# Patient Record
Sex: FEMALE | Race: WHITE | Employment: FULL TIME | URBAN - METROPOLITAN AREA
[De-identification: names, ages, dates, MRNs, and addresses within clinical notes are randomized per-mention and may not be internally consistent; named-entity substitution may affect disease eponyms.]

---

## 2017-01-09 DIAGNOSIS — F41.8 ANXIETY ASSOCIATED WITH DEPRESSION: ICD-10-CM

## 2017-01-11 DIAGNOSIS — F41.8 ANXIETY ASSOCIATED WITH DEPRESSION: ICD-10-CM

## 2017-01-11 RX ORDER — LORAZEPAM 1 MG/1
3 TABLET ORAL
Qty: 90 TAB | Refills: 0 | OUTPATIENT
Start: 2017-01-11 | End: 2017-02-09 | Stop reason: SDUPTHER

## 2017-01-11 RX ORDER — LORAZEPAM 1 MG/1
3 TABLET ORAL
Qty: 90 TAB | Refills: 2 | OUTPATIENT
Start: 2017-01-11

## 2017-01-11 NOTE — TELEPHONE ENCOUNTER
From: Rosaura Ward  To: Efrem Shine NP  Sent: 1/9/2017 6:17 PM EST  Subject: Medication Renewal Request    Original authorizing provider: AKASH Koenig would like a refill of the following medications:  LORazepam (ATIVAN) 1 mg tablet Efrem Shine NP]    Preferred pharmacy: [place] [id number]:

## 2017-01-13 ENCOUNTER — OFFICE VISIT (OUTPATIENT)
Dept: FAMILY MEDICINE CLINIC | Age: 52
End: 2017-01-13

## 2017-01-13 VITALS
HEART RATE: 62 BPM | BODY MASS INDEX: 36.07 KG/M2 | WEIGHT: 196 LBS | RESPIRATION RATE: 18 BRPM | TEMPERATURE: 97.7 F | DIASTOLIC BLOOD PRESSURE: 80 MMHG | OXYGEN SATURATION: 99 % | HEIGHT: 62 IN | SYSTOLIC BLOOD PRESSURE: 105 MMHG

## 2017-01-13 DIAGNOSIS — E55.9 VITAMIN D DEFICIENCY: ICD-10-CM

## 2017-01-13 DIAGNOSIS — Z00.00 ANNUAL PHYSICAL EXAM: Primary | ICD-10-CM

## 2017-01-13 DIAGNOSIS — J30.9 CHRONIC ALLERGIC RHINITIS: ICD-10-CM

## 2017-01-13 DIAGNOSIS — Z12.11 SCREENING FOR MALIGNANT NEOPLASM OF COLON: ICD-10-CM

## 2017-01-13 DIAGNOSIS — E03.9 HYPOTHYROIDISM, UNSPECIFIED TYPE: ICD-10-CM

## 2017-01-13 RX ORDER — HYDROXYZINE 25 MG/1
TABLET, FILM COATED ORAL
Refills: 0 | COMMUNITY
Start: 2016-10-12 | End: 2017-01-13 | Stop reason: SDUPTHER

## 2017-01-13 RX ORDER — HYDROXYZINE 25 MG/1
TABLET, FILM COATED ORAL
Qty: 30 TAB | Refills: 2 | Status: SHIPPED | OUTPATIENT
Start: 2017-01-13

## 2017-01-13 NOTE — PROGRESS NOTES
Chief Complaint   Patient presents with   Evan Jeffers     Patient in office today for physical and fasting labs; have c.o of sinus and ear problem. 1. Have you been to the ER, urgent care clinic since your last visit? Hospitalized since your last visit? No    2. Have you seen or consulted any other health care providers outside of the [address] [place] [address] since your last visit? Include any pap smears or colon screening.  No

## 2017-01-13 NOTE — PROGRESS NOTES
Chief Complaint   Patient presents with    Physical    Labs     Patient in office today for physical and fasting labs; Pt also c/o of sinus and ear problem. 1. Have you been to the ER, urgent care clinic since your last visit? Hospitalized since your last visit? No    2. Have you seen or consulted any other health care providers outside of the [facility] since your last visit? Include any pap smears or colon screening. No    Health Maintenance Due   Topic Date Due    INFLUENZA AGE 9 TO ADULT  08/01/2016     Pt was seen at [facility] in Oct for ear infection sx. Was prescribed hydroxyzine which has really helped clear up her sinuses and congestion. Also helping her sinus headaches when they flare. Started having a sinus headache that started last night. Denies any fever. Denies any sick contacts. Denies any real drainage, sinuses feel pressure and swollen. Pt went to see ENT. Was told she had TMJ but dentist confirmed she did not have TMJ. Received her flu shot in October. Denies any other concerns at this time. Chief Complaint   Patient presents with   Evan Jeffers     she is a 54y.o. year old female who presents for evalution. Reviewed PmHx, RxHx, FmHx, SocHx, AllgHx and updated and dated in the chart.     Review of Systems - negative except as listed above in the HPI    Objective:     Vitals:    01/13/17 0803   BP: 105/80   Pulse: 62   Resp: 18   Temp: 97.7 °F (36.5 °C)   TempSrc: Oral   SpO2: 99%   Weight: 196 lb (88.9 kg)   Height: 5' 2\" (1.575 m)     Physical Examination: General appearance - alert, well appearing, and in no distress  Mental status - normal mood, behavior  Eyes - pupils equal and reactive, extraocular eye movements intact  Ears - bilateral TM's and external ear canals normal  Nose - normal and patent, no erythema, discharge or polyps and normal nontender sinuses  Mouth - mucous membranes moist, pharynx normal without lesions  Neck - supple, no significant adenopathy, carotids upstroke normal bilaterally, no bruits, thyroid exam: thyroid is normal in size without nodules or tenderness  Chest - clear to auscultation, no wheezes, rales or rhonchi, symmetric air entry  Heart - normal rate, regular rhythm, normal S1, S2, no murmurs  Extremities - peripheral pulses normal, no ankle edema, no clubbing or cyanosis    Assessment/ Plan:   Yarelis was seen today for physical and labs. Diagnoses and all orders for this visit:    Annual physical exam  -     LIPID PANEL  -     METABOLIC PANEL, COMPREHENSIVE  -     CBC WITH AUTOMATED DIFF  Healthy 54year old female. Will notify results of labs and deviate plan based on findings. Enc to follow a healthy diet and exercise as tolerated. Hypothyroidism, unspecified type  -     TSH 3RD GENERATION  Will deviate plan based on findings. Chronic allergic rhinitis  -     hydrOXYzine HCl (ATARAX) 25 mg tablet; TAKE ONE TABLET BY MOUTH daily as needed for allergies. Refilled rx. Continue taking daily as needed for allergic rhinitis flares. Continue other meds as prescribed. Vitamin D deficiency  -     VITAMIN D, 25 HYDROXY  Will deviate plan based on findings. Screening for malignant neoplasm of colon  -     REFERRAL TO COLON AND RECTAL SURGERY  Referral to colon and rectal for initial screening colonoscopy. Follow-up Disposition:  Return if symptoms worsen or fail to improve. I have discussed the diagnosis with the patient and the intended plan as seen in the above orders. The patient has received an after-visit summary and questions were answered concerning future plans.      Medication Side Effects and Warnings were discussed with patient: yes  Patient Labs were reviewed and or requested: yes  Patient Past Records were reviewed and or requested  yes  Patient / Caregiver Understanding of treatment plan was verbalized during office visit YES    ELIAN Black    There are no Patient Instructions on file for this visit.

## 2017-01-13 NOTE — MR AVS SNAPSHOT
Visit Information Date & Time Provider Department Dept. Phone Encounter #  
 1/13/2017  8:00 AM Chidi Rendon NP [address] [facility] [phone] [id number] Follow-up Instructions Return if symptoms worsen or fail to improve. Upcoming Health Maintenance Date Due INFLUENZA AGE 9 TO ADULT 8/1/2016 PAP AKA CERVICAL CYTOLOGY 3/10/2018 DTaP/Tdap/Td series (2 - Td) 2/25/2026 Allergies as of 1/13/2017  Review Complete On: 1/13/2017 By: Chidi Rendon NP Severity Noted Reaction Type Reactions Biaxin [Clarithromycin]  06/23/2015    Diarrhea Other Medication  06/23/2015    Not Reported This Time  
 steroids Current Immunizations  Reviewed on 2/25/2016 Name Date Influenza Vaccine 10/1/2016, 10/1/2015 Tdap 2/25/2016 Not reviewed this visit You Were Diagnosed With   
  
 Codes Comments Annual physical exam    -  Primary ICD-10-CM: Z00.00 ICD-9-CM: V70.0 Hypothyroidism, unspecified type     ICD-10-CM: E03.9 ICD-9-CM: 382. 9 Chronic allergic rhinitis     ICD-10-CM: J30.9 ICD-9-CM: 477.9 Vitamin D deficiency     ICD-10-CM: E55.9 ICD-9-CM: 268.9 Screening for malignant neoplasm of colon     ICD-10-CM: Z12.11 ICD-9-CM: V76.51 Vitals BP Pulse Temp Resp Height(growth percentile) Weight(growth percentile) 105/80 (BP 1 Location: Right arm, BP Patient Position: Sitting) 62 97.7 °F (36.5 °C) (Oral) 18 5' 2\" (1.575 m) 196 lb (88.9 kg) LMP SpO2 BMI OB Status Smoking Status 01/02/2017 99% 35.85 kg/m2 Premenopausal Never Smoker Vitals History BMI and BSA Data Body Mass Index Body Surface Area  
 35.85 kg/m 2 1.97 m 2 Preferred Pharmacy Pharmacy Name Phone [facility] [address] [facility], [address] [place] [address] Your Updated Medication List  
  
   
This list is accurate as of: 1/13/17  8:35 AM.  Always use your most recent med list.  
  
  
  
  
 Biotin 2,500 mcg Cap Take 5,000 mcg by mouth. buPROPion  mg SR tablet Commonly known as:  Nael Ponds Take 1 Tab by mouth two (2) times a day. CHILDREN'S MULTI VITAMINS PO Take  by mouth. hydrOXYzine HCl 25 mg tablet Commonly known as:  ATARAX TAKE ONE TABLET BY MOUTH daily as needed for allergies. levothyroxine 137 mcg tablet Commonly known as:  SYNTHROID Take 137 mcg by mouth Daily (before breakfast). Dispense brand Synthroid.  
  
 loratadine 10 mg tablet Commonly known as:  Hitesh Fleeting Take 10 mg by mouth daily. LORazepam 1 mg tablet Commonly known as:  ATIVAN Take 3 Tabs by mouth nightly as needed for Anxiety. Max Daily Amount: 3 mg. Prescriptions Sent to Pharmacy Refills  
 hydrOXYzine HCl (ATARAX) 25 mg tablet 2 Sig: TAKE ONE TABLET BY MOUTH daily as needed for allergies. Class: Normal  
 Pharmacy: [facility]. [address], [address] [place] [address] Ph #: [phone] We Performed the Following CBC WITH AUTOMATED DIFF [17991 CPT(R)] LIPID PANEL [43023 CPT(R)] METABOLIC PANEL, COMPREHENSIVE [08137 CPT(R)] REFERRAL TO COLON AND RECTAL SURGERY [REF17 Custom] TSH 3RD GENERATION [76631 CPT(R)] VITAMIN D, 25 HYDROXY [id number] CPT(R)] Follow-up Instructions Return if symptoms worsen or fail to improve. Referral Information Referral ID Referred By Referred To  
  
 [id number] Raymundo Diamond MD   
   [address] [facility] [address] [facility], [address] Phone: [phone] Fax: [phone] Visits Status Start Date End Date 1 New Request 1/13/17 1/13/18 If your referral has a status of pending review or denied, additional information will be sent to support the outcome of this decision. Introducing [facility] & HEALTH SERVICES! Dear Samson Ferrell: Thank you for requesting a Stemgent account. Our records indicate that you already have an active Stemgent account. You can access your account anytime at [email or web address] Did you know that you can access your hospital and ER discharge instructions at any time in Stemgent? You can also review all of your test results from your hospital stay or ER visit. Additional Information If you have questions, please visit the Frequently Asked Questions section of the Stemgent website at [email or web address] Remember, Stemgent is NOT to be used for urgent needs. For medical emergencies, dial 911. Now available from your iPhone and Android! Please provide this summary of care documentation to your next provider. Your primary care clinician is listed as Santo Cole. If you have any questions after today's visit, please call [phone].

## 2017-01-14 LAB
25(OH)D3+25(OH)D2 SERPL-MCNC: 40.1 NG/ML (ref 30–100)
ALBUMIN SERPL-MCNC: 4.5 G/DL (ref 3.5–5.5)
ALBUMIN/GLOB SERPL: 1.9 {RATIO} (ref 1.1–2.5)
ALP SERPL-CCNC: 97 IU/L (ref 39–117)
ALT SERPL-CCNC: 27 IU/L (ref 0–32)
AST SERPL-CCNC: 22 IU/L (ref 0–40)
BASOPHILS # BLD AUTO: 0 X10E3/UL (ref 0–0.2)
BASOPHILS NFR BLD AUTO: 0 %
BILIRUB SERPL-MCNC: 0.4 MG/DL (ref 0–1.2)
BUN SERPL-MCNC: 12 MG/DL (ref 6–24)
BUN/CREAT SERPL: 15 (ref 9–23)
CALCIUM SERPL-MCNC: 9.1 MG/DL (ref 8.7–10.2)
CHLORIDE SERPL-SCNC: 103 MMOL/L (ref 96–106)
CHOLEST SERPL-MCNC: 213 MG/DL (ref 100–199)
CO2 SERPL-SCNC: 22 MMOL/L (ref 18–29)
CREAT SERPL-MCNC: 0.79 MG/DL (ref 0.57–1)
EOSINOPHIL # BLD AUTO: 0.2 X10E3/UL (ref 0–0.4)
EOSINOPHIL NFR BLD AUTO: 4 %
ERYTHROCYTE [DISTWIDTH] IN BLOOD BY AUTOMATED COUNT: 13.7 % (ref 12.3–15.4)
GLOBULIN SER CALC-MCNC: 2.4 G/DL (ref 1.5–4.5)
GLUCOSE SERPL-MCNC: 97 MG/DL (ref 65–99)
HCT VFR BLD AUTO: 40.2 % (ref 34–46.6)
HDLC SERPL-MCNC: 47 MG/DL
HGB BLD-MCNC: 13.5 G/DL (ref 11.1–15.9)
IMM GRANULOCYTES # BLD: 0 X10E3/UL (ref 0–0.1)
IMM GRANULOCYTES NFR BLD: 0 %
INTERPRETATION, 910389: NORMAL
LDLC SERPL CALC-MCNC: 142 MG/DL (ref 0–99)
LYMPHOCYTES # BLD AUTO: 2 X10E3/UL (ref 0.7–3.1)
LYMPHOCYTES NFR BLD AUTO: 37 %
MCH RBC QN AUTO: 31.5 PG (ref 26.6–33)
MCHC RBC AUTO-ENTMCNC: 33.6 G/DL (ref 31.5–35.7)
MCV RBC AUTO: 94 FL (ref 79–97)
MONOCYTES # BLD AUTO: 0.4 X10E3/UL (ref 0.1–0.9)
MONOCYTES NFR BLD AUTO: 7 %
NEUTROPHILS # BLD AUTO: 2.9 X10E3/UL (ref 1.4–7)
NEUTROPHILS NFR BLD AUTO: 52 %
PLATELET # BLD AUTO: 386 X10E3/UL (ref 150–379)
POTASSIUM SERPL-SCNC: 4.6 MMOL/L (ref 3.5–5.2)
PROT SERPL-MCNC: 6.9 G/DL (ref 6–8.5)
RBC # BLD AUTO: 4.29 X10E6/UL (ref 3.77–5.28)
SODIUM SERPL-SCNC: 140 MMOL/L (ref 134–144)
TRIGL SERPL-MCNC: 119 MG/DL (ref 0–149)
TSH SERPL DL<=0.005 MIU/L-ACNC: 1.67 UIU/ML (ref 0.45–4.5)
VLDLC SERPL CALC-MCNC: 24 MG/DL (ref 5–40)
WBC # BLD AUTO: 5.5 X10E3/UL (ref 3.4–10.8)

## 2017-01-15 DIAGNOSIS — E78.2 MIXED HYPERLIPIDEMIA: Primary | ICD-10-CM

## 2017-01-15 RX ORDER — SIMVASTATIN 10 MG/1
10 TABLET, FILM COATED ORAL
Qty: 90 TAB | Refills: 1 | Status: SHIPPED | OUTPATIENT
Start: 2017-01-15 | End: 2017-02-16 | Stop reason: ALTCHOICE

## 2017-01-16 NOTE — PROGRESS NOTES
The following message was sent to pt via [employer] portal in reference to lab results:    Good morning Ms. Colon So are the results of your most recent lab work. I have the following recommendations. 1. Your CBC which looks at your white blood cells, red blood cells, and hemoglobin came back looking normal. No sign of infection or anemia. 2. Your metabolic panel which looks at your blood glucose, liver function, and kidney function looks perfect. 3. Your cholesterol has really worsened to the point that I think you need to start a new daily medication. I want to start you on a medication called zocor. It is a medication that is used to lower cholesterol. I want you to take one tab every night for the next 4-6 weeks and then we can recheck your cholesterol to make sure it is coming down. The BEST way to lower cholesterol is to follow a strict diet that is low fat combined with regular exercise. Here are a few tips on how to do this:  - Avoid foods that are high in saturated fats (especially fried foods)  - Replace butter with margarine  - Eat lots of fresh fruits and vegetables  - Choose fish, chicken, and turkey as your serving of meat  - Try to avoid too many processed foods  - Choose non fat milk  - Use whole wheat bread  You should also try and do 30 minutes of aerobic exercise most days of the week. All of these will contribute to lowering your cholesterol and decrease your risk of heart disease. 4. Your thyroid function is stable on your current dose of synthroid. 5. Your vitamin D levels are normal.     Lets recheck these labs in 4-6 weeks, fasting. The zocor has been sent to your pharmacy.  Please do not hesitate to call me or schedule an appointment to be seen if you need anything else in the meantime :)    ELIAN Miles

## 2017-02-09 ENCOUNTER — TELEPHONE (OUTPATIENT)
Dept: FAMILY MEDICINE CLINIC | Age: 52
End: 2017-02-09

## 2017-02-09 DIAGNOSIS — F41.8 ANXIETY ASSOCIATED WITH DEPRESSION: ICD-10-CM

## 2017-02-09 RX ORDER — LORAZEPAM 1 MG/1
3 TABLET ORAL
Qty: 90 TAB | Refills: 2 | OUTPATIENT
Start: 2017-02-09 | End: 2017-05-07 | Stop reason: SDUPTHER

## 2017-02-16 ENCOUNTER — OFFICE VISIT (OUTPATIENT)
Dept: FAMILY MEDICINE CLINIC | Age: 52
End: 2017-02-16

## 2017-02-16 VITALS
DIASTOLIC BLOOD PRESSURE: 67 MMHG | OXYGEN SATURATION: 97 % | RESPIRATION RATE: 18 BRPM | BODY MASS INDEX: 35.63 KG/M2 | SYSTOLIC BLOOD PRESSURE: 93 MMHG | TEMPERATURE: 98 F | HEART RATE: 89 BPM | WEIGHT: 193.6 LBS | HEIGHT: 62 IN

## 2017-02-16 DIAGNOSIS — J06.9 UPPER RESPIRATORY TRACT INFECTION, UNSPECIFIED TYPE: Primary | ICD-10-CM

## 2017-02-16 RX ORDER — CETIRIZINE HYDROCHLORIDE, PSEUDOEPHEDRINE HYDROCHLORIDE 5; 120 MG/1; MG/1
1 TABLET, FILM COATED, EXTENDED RELEASE ORAL 2 TIMES DAILY
Qty: 60 TAB | Refills: 1 | Status: SHIPPED | OUTPATIENT
Start: 2017-02-16 | End: 2017-02-24

## 2017-02-16 NOTE — MR AVS SNAPSHOT
Visit Information Date & Time Provider Department Dept. Phone Encounter #  
 2/16/2017  3:00 PM Pastora Rodrigues MD [address] [phone] [id number] Follow-up Instructions Return if symptoms worsen or fail to improve. Your Appointments 2/24/2017  7:30 AM  
ESTABLISHED PATIENT with Jessica Mo, AKASH [address] ([address]) Appt Note: 6 wk fuv with fasting labs [address] [id number] [phone]  
  
   
 [address] [address]  
  
    
 4/7/2017  7:45 AM  
ESTABLISHED PATIENT with Jessica MoAKASH [address] ([address]) Appt Note: 3mo f/u med ck [address] [address] [phone] Upcoming Health Maintenance Date Due FOBT Q 1 YEAR AGE 50-75 1/27/2017 BREAST CANCER SCRN MAMMOGRAM 3/16/2017 PAP AKA CERVICAL CYTOLOGY 3/10/2018 DTaP/Tdap/Td series (2 - Td) 2/25/2026 Allergies as of 2/16/2017  Review Complete On: 2/16/2017 By: Pastora Rodrigues MD  
  
 Severity Noted Reaction Type Reactions Biaxin [Clarithromycin]  06/23/2015    Diarrhea Other Medication  06/23/2015    Not Reported This Time  
 steroids Current Immunizations  Reviewed on 2/25/2016 Name Date Influenza Vaccine 10/1/2016, 10/1/2015 Tdap 2/25/2016 Not reviewed this visit You Were Diagnosed With   
  
 Codes Comments Upper respiratory tract infection, unspecified type    -  Primary ICD-10-CM: J06.9 ICD-9-CM: 465.9 Vitals BP Pulse Temp Resp Height(growth percentile) Weight(growth percentile) 93/67 89 98 °F (36.7 °C) 18 5' 2\" (1.575 m) 193 lb 9.6 oz (87.8 kg) SpO2 BMI OB Status Smoking Status 97% 35.41 kg/m2 Premenopausal Never Smoker Vitals History BMI and BSA Data Body Mass Index Body Surface Area  
 35.41 kg/m 2 1.96 m 2 Preferred Pharmacy Pharmacy Name Phone [facility] [address] [facility], [address] [facility] [address] [place] [address] Your Updated Medication List  
  
   
This list is accurate as of: 2/16/17  3:17 PM.  Always use your most recent med list.  
  
  
  
  
 Biotin 2,500 mcg Cap Take 5,000 mcg by mouth. buPROPion  mg SR tablet Commonly known as:  Charito Ling Take 1 Tab by mouth two (2) times a day. cetirizine-psuedoePHEDrine 5-120 mg per tablet Commonly known as:  ZyrTEC-D Take 1 Tab by mouth two (2) times a day. Indications: Nasal Congestion CHILDREN'S MULTI VITAMINS PO Take  by mouth. hydrOXYzine HCl 25 mg tablet Commonly known as:  ATARAX TAKE ONE TABLET BY MOUTH daily as needed for allergies. levothyroxine 137 mcg tablet Commonly known as:  SYNTHROID Take 137 mcg by mouth Daily (before breakfast). Dispense brand Synthroid.  
  
 loratadine 10 mg tablet Commonly known as:  Alpheus French Take 10 mg by mouth daily. LORazepam 1 mg tablet Commonly known as:  ATIVAN Take 3 Tabs by mouth nightly as needed for Anxiety. Max Daily Amount: 3 mg. Prescriptions Sent to Pharmacy Refills  
 cetirizine-psuedoePHEDrine (ZYRTEC-D) 5-120 mg per tablet 1 Sig: Take 1 Tab by mouth two (2) times a day. Indications: Nasal Congestion Class: Normal  
 Pharmacy: [facility]. [address], [facility]  #: [phone] Route: Oral  
  
Follow-up Instructions Return if symptoms worsen or fail to improve. Introducing [facility] & HEALTH SERVICES! Dear Daniela Cheek: Thank you for requesting a Sonos account. Our records indicate that you already have an active Sonos account. You can access your account anytime at [email or web address] Did you know that you can access your hospital and ER discharge instructions at any time in Sonos? You can also review all of your test results from your hospital stay or ER visit. Additional Information If you have questions, please visit the Frequently Asked Questions section of the [employer] mini[employer]T website at [email or web address] Remember, RÃƒÂ¶sler miniDaT is NOT to be used for urgent needs. For medical emergencies, dial 911. Now available from your iPhone and Android! Please provide this summary of care documentation to your next provider. Your primary care clinician is listed as Fina Waite. If you have any questions after today's visit, please call [phone].

## 2017-02-16 NOTE — PROGRESS NOTES
Patient here for sinus headache x 10 days. Using otc meds, not really working. 1. Have you been to the ER, urgent care clinic since your last visit? Hospitalized since your last visit? No    2. Have you seen or consulted any other health care providers outside of the [address] [place] [address] since your last visit? Include any pap smears or colon screening. No     Chief Complaint   Patient presents with    Pressure Behind the Eyes     sinus headache x 10 days. otc meds. she is a 50y.o. year old female who presents for evalution. Reviewed PmHx, RxHx, FmHx, SocHx, AllgHx and updated and dated in the chart. Patient Active Problem List    Diagnosis    Mixed hyperlipidemia    Vitamin D deficiency    Primary hypothyroidism    Depression       Review of Systems - negative except as listed above in the HPI    Objective:     Vitals:    02/16/17 1511   BP: 93/67   Pulse: 89   Resp: 18   Temp: 98 °F (36.7 °C)   SpO2: 97%   Weight: 193 lb 9.6 oz (87.8 kg)   Height: 5' 2\" (1.575 m)     Physical Examination: General appearance - alert, well appearing, and in no distress  Eyes - pupils equal and reactive, extraocular eye movements intact  Ears - bilateral TM's and external ear canals normal  Nose - normal and patent, no erythema, discharge or polyps  Mouth - mucous membranes moist, pharynx normal without lesions  Neck - supple, no significant adenopathy  Chest - clear to auscultation, no wheezes, rales or rhonchi, symmetric air entry  Heart - normal rate, regular rhythm, normal S1, S2, no murmurs, rubs, clicks or gallops      Assessment/ Plan:   Claudia was seen today for pressure behind the eyes. Diagnoses and all orders for this visit:    Upper respiratory tract infection, unspecified type  -     cetirizine-psuedoePHEDrine (ZYRTEC-D) 5-120 mg per tablet; Take 1 Tab by mouth two (2) times a day. Indications: Nasal Congestion       Follow-up Disposition:  Return if symptoms worsen or fail to improve.     I have discussed the diagnosis with the patient and the intended plan as seen in the above orders. The patient understands and agrees with the plan. The patient has received an after-visit summary and questions were answered concerning future plans. Medication Side Effects and Warnings were discussed with patient  Patient Labs were reviewed and or requested:  Patient Past Records were reviewed and or requested    Veronica Kc M.D. There are no Patient Instructions on file for this visit.

## 2017-02-24 ENCOUNTER — OFFICE VISIT (OUTPATIENT)
Dept: FAMILY MEDICINE CLINIC | Age: 52
End: 2017-02-24

## 2017-02-24 VITALS
SYSTOLIC BLOOD PRESSURE: 107 MMHG | RESPIRATION RATE: 18 BRPM | TEMPERATURE: 97.7 F | WEIGHT: 190 LBS | BODY MASS INDEX: 34.96 KG/M2 | DIASTOLIC BLOOD PRESSURE: 72 MMHG | HEART RATE: 89 BPM | HEIGHT: 62 IN | OXYGEN SATURATION: 97 %

## 2017-02-24 DIAGNOSIS — E78.2 MIXED HYPERLIPIDEMIA: Primary | ICD-10-CM

## 2017-02-24 DIAGNOSIS — J32.9 CHRONIC RECURRENT SINUSITIS: ICD-10-CM

## 2017-02-24 DIAGNOSIS — J30.89 ALLERGIC RHINITIS DUE TO MOLD: ICD-10-CM

## 2017-02-24 RX ORDER — FLUTICASONE PROPIONATE 50 MCG
2 SPRAY, SUSPENSION (ML) NASAL DAILY
COMMUNITY

## 2017-02-24 RX ORDER — DOXYCYCLINE HYCLATE 100 MG
TABLET ORAL
Refills: 0 | COMMUNITY
Start: 2017-02-18 | End: 2017-05-05

## 2017-02-24 RX ORDER — MONTELUKAST SODIUM 10 MG/1
10 TABLET ORAL DAILY
Qty: 30 TAB | Refills: 5 | Status: SHIPPED | OUTPATIENT
Start: 2017-02-24 | End: 2017-05-05

## 2017-02-24 NOTE — PROGRESS NOTES
Chief Complaint   Patient presents with    Cholesterol Problem    Labs     Patient in office today for 6 wk f/u and fasting labs; Have c/o of possible sinus infection was seen at [facility] on Saturday was prescribed abx. 1. Have you been to the ER, urgent care clinic since your last visit? Hospitalized since your last visit? No    2. Have you seen or consulted any other health care providers outside of the [address] [place] [address] since your last visit? Include any pap smears or colon screening. No    Pt started with sinus sx last week. Was given zyrtec D initially which did not seem to help. Sx progressively worsened so [facility] went to [facility] and was diagnosed with sinus infection. Has been taking doxy since. Taking claritin and flonase daily without relief. Feeling better but still has some sinus pressure and headaches. Has lost 6 lbs since last OV. Joined a gym yesterday. Thinks that cholesterol should look better. Has really been working on diet. Denies any other concerns at this time. Chief Complaint   Patient presents with    Cholesterol Problem    Labs     she is a 50y.o. year old female who presents for evalution. Reviewed PmHx, RxHx, FmHx, SocHx, AllgHx and updated and dated in the chart.     Review of Systems - negative except as listed above in the HPI    Objective:     Vitals:    02/24/17 0751   BP: 107/72   Pulse: 89   Resp: 18   Temp: 97.7 °F (36.5 °C)   TempSrc: Oral   SpO2: 97%   Weight: 190 lb (86.2 kg)   Height: 5' 2\" (1.575 m)     Physical Examination: General appearance - alert, well appearing, and in no distress  Eyes - pupils equal and reactive, extraocular eye movements intact  Ears - bilateral TM's and external ear canals normal  Nose - mucosal congestion, mucosal erythema, clear rhinorrhea and sinus tenderness noted bilateral maxillary sinuses  Mouth - mucous membranes moist, pharynx normal without lesions  Neck - supple, no significant adenopathy  Chest - clear to auscultation, no wheezes, rales or rhonchi, symmetric air entry  Heart - normal rate, regular rhythm, normal S1, S2, no murmurs    Assessment/ Plan:   Claudia was seen today for cholesterol problem and labs. Diagnoses and all orders for this visit:    Mixed hyperlipidemia  -     LIPID PANEL  -     METABOLIC PANEL, COMPREHENSIVE  Will notify results and deviate plan based on findings. Continue with efforts to follow a heart healthy diet and exercise as tolerated. Chronic recurrent sinusitis / Allergic rhinitis due to mold  -     montelukast (SINGULAIR) 10 mg tablet; Take 1 Tab by mouth daily. Complete doxycycline as directed. Continue claritin and flonase daily. Add singulair. Follow up if sx persist or worsen. Follow-up Disposition:  Return if symptoms worsen or fail to improve. I have discussed the diagnosis with the patient and the intended plan as seen in the above orders. The patient has received an after-visit summary and questions were answered concerning future plans. Medication Side Effects and Warnings were discussed with patient: yes  Patient Labs were reviewed and or requested: no  Patient Past Records were reviewed and or requested  yes  Patient / Caregiver Understanding of treatment plan was verbalized during office visit YES    ELIAN Carter    There are no Patient Instructions on file for this visit.

## 2017-02-24 NOTE — PROGRESS NOTES
Chief Complaint   Patient presents with    Cholesterol Problem    Labs     Patient in office today for 6 wk f/u and fasting labs; have c/o of possible sinus infection was seen at [facility] on Saturday was prescribed abx. 1. Have you been to the ER, urgent care clinic since your last visit? Hospitalized since your last visit? No    2. Have you seen or consulted any other health care providers outside of the [address] [place] [address] since your last visit? Include any pap smears or colon screening.  No

## 2017-02-25 LAB
ALBUMIN SERPL-MCNC: 4.5 G/DL (ref 3.5–5.5)
ALBUMIN/GLOB SERPL: 1.8 {RATIO} (ref 1.1–2.5)
ALP SERPL-CCNC: 102 IU/L (ref 39–117)
ALT SERPL-CCNC: 42 IU/L (ref 0–32)
AST SERPL-CCNC: 31 IU/L (ref 0–40)
BILIRUB SERPL-MCNC: 0.4 MG/DL (ref 0–1.2)
BUN SERPL-MCNC: 13 MG/DL (ref 6–24)
BUN/CREAT SERPL: 16 (ref 9–23)
CALCIUM SERPL-MCNC: 9.4 MG/DL (ref 8.7–10.2)
CHLORIDE SERPL-SCNC: 103 MMOL/L (ref 96–106)
CHOLEST SERPL-MCNC: 186 MG/DL (ref 100–199)
CO2 SERPL-SCNC: 23 MMOL/L (ref 18–29)
CREAT SERPL-MCNC: 0.79 MG/DL (ref 0.57–1)
GLOBULIN SER CALC-MCNC: 2.5 G/DL (ref 1.5–4.5)
GLUCOSE SERPL-MCNC: 96 MG/DL (ref 65–99)
HDLC SERPL-MCNC: 39 MG/DL
INTERPRETATION, 910389: NORMAL
LDLC SERPL CALC-MCNC: 111 MG/DL (ref 0–99)
POTASSIUM SERPL-SCNC: 4.7 MMOL/L (ref 3.5–5.2)
PROT SERPL-MCNC: 7 G/DL (ref 6–8.5)
SODIUM SERPL-SCNC: 142 MMOL/L (ref 134–144)
TRIGL SERPL-MCNC: 179 MG/DL (ref 0–149)
VLDLC SERPL CALC-MCNC: 36 MG/DL (ref 5–40)

## 2017-02-26 NOTE — PROGRESS NOTES
The following message was sent to pt via [employer] portal in reference to lab results:    Barrera Taylor,     Attached are the results of your most recent lab work. I have the following recommendations:    1. Your metabolic panel which looks at your blood glucose, liver function, and kidney function looks perfect. 2. Your cholesterol has really improved with your diet and lifestyle efforts. You should be very proud! Your hard work has paid off! I think we can hold off on making any other adjustments at this time. I recommend you keep up the good work and we recheck fasting labs in 3 months. Please let me know if you have any questions or concerns regarding these results.    Arely Score, FNP-C

## 2017-03-10 DIAGNOSIS — E03.9 PRIMARY HYPOTHYROIDISM: ICD-10-CM

## 2017-03-12 RX ORDER — LEVOTHYROXINE SODIUM 137 UG/1
137 TABLET ORAL
Qty: 30 TAB | Refills: 5 | Status: SHIPPED | OUTPATIENT
Start: 2017-03-12

## 2017-03-12 NOTE — TELEPHONE ENCOUNTER
From: Abel Melton  To: Neri Velasquez NP  Sent: 3/10/2017 7:41 PM EST  Subject: Medication Renewal Request    Original authorizing provider: AKASH Leos would like a refill of the following medications:  levothyroxine (SYNTHROID) 137 mcg tablet Neri Velasquez NP]    Preferred pharmacy: [place] [address] [address] [place]:

## 2017-05-05 ENCOUNTER — OFFICE VISIT (OUTPATIENT)
Dept: FAMILY MEDICINE CLINIC | Age: 52
End: 2017-05-05

## 2017-05-05 VITALS
WEIGHT: 183 LBS | HEIGHT: 62 IN | DIASTOLIC BLOOD PRESSURE: 73 MMHG | SYSTOLIC BLOOD PRESSURE: 102 MMHG | RESPIRATION RATE: 18 BRPM | OXYGEN SATURATION: 98 % | HEART RATE: 86 BPM | BODY MASS INDEX: 33.68 KG/M2 | TEMPERATURE: 97.6 F

## 2017-05-05 DIAGNOSIS — F41.1 GAD (GENERALIZED ANXIETY DISORDER): ICD-10-CM

## 2017-05-05 DIAGNOSIS — E78.2 MIXED HYPERLIPIDEMIA: ICD-10-CM

## 2017-05-05 DIAGNOSIS — E03.9 PRIMARY HYPOTHYROIDISM: ICD-10-CM

## 2017-05-05 DIAGNOSIS — E55.9 VITAMIN D DEFICIENCY: ICD-10-CM

## 2017-05-05 DIAGNOSIS — R92.2 DENSE BREAST TISSUE ON MAMMOGRAM: ICD-10-CM

## 2017-05-05 DIAGNOSIS — Z12.11 SCREENING FOR MALIGNANT NEOPLASM OF COLON: ICD-10-CM

## 2017-05-05 DIAGNOSIS — K58.2 IRRITABLE BOWEL SYNDROME WITH BOTH CONSTIPATION AND DIARRHEA: Primary | ICD-10-CM

## 2017-05-05 DIAGNOSIS — Z12.39 SCREENING FOR MALIGNANT NEOPLASM OF BREAST: ICD-10-CM

## 2017-05-05 RX ORDER — DICYCLOMINE HYDROCHLORIDE 10 MG/1
10 CAPSULE ORAL
Qty: 90 CAP | Refills: 1 | Status: SHIPPED | OUTPATIENT
Start: 2017-05-05

## 2017-05-05 NOTE — PATIENT INSTRUCTIONS
Dicyclomine (By mouth)   Dicyclomine (dye-SYE-kloe-meen)  Treats irritable bowel syndrome. Brand Name(s): Bentyl   There may be other brand names for this medicine. When This Medicine Should Not Be Used: You should not use this medicine if you have had an allergic reaction to dicyclomine. Do not use this medicine if you have glaucoma, myasthenia gravis, or trouble urinating because of a blockage (such as an enlarged prostate). Make sure your doctor knows about all digestion problems you have, including reflux esophagitis (GERD), or severe ulcerative colitis. You should not use this medicine if you are breast feeding. This medicine should not be given to infants less than 6 months old. How to Use This Medicine:   Capsule, Tablet, Long Acting Tablet, Liquid  · Take your medicine as directed. Your dose may need to be changed several times to find what works best for you. · Swallow the extended-release tablet whole. Do not break, crush or chew. · Measure the oral liquid medicine with a marked measuring spoon, oral syringe, or medicine cup. If a dose is missed:   · Take a dose as soon as you remember. If it is almost time for your next dose, wait until then and take a regular dose. Do not take extra medicine to make up for a missed dose. How to Store and Dispose of This Medicine:   · Store the medicine in a closed container at room temperature, away from heat, moisture, and direct light. Do not freeze the oral liquid. · Ask your pharmacist, doctor, or health caregiver about the best way to dispose of any outdated medicine or medicine no longer needed. · Keep all medicine out of the reach of children. Never share your medicine with anyone. Drugs and Foods to Avoid:   Ask your doctor or pharmacist before using any other medicine, including over-the-counter medicines, vitamins, and herbal products.   · Make sure your doctor knows if you are using amantadine (Symmetrel®), digoxin (Lanoxin®), or a belladonna medicine such as atropine, hyoscyamine, scopolamine, Anaspaz®, Arco-Lase® Plus, or Donnatal®. Tell your doctor if you are using an MAO inhibitor such as Eldepryl®, Marplan®, [place], or Parnate®. · Tell your doctor if you are also using narcotic pain medicine, medicine for heart rhythm problems, an antihistamine, medicine to treat depression, phenothiazines (medicines to treat certain mental problems or severe nausea or vomiting), or a steroid medicine. Meperidine (Demerol®) is a narcotic pain medicine. Some medicines for heart rhythm problems are disopyramide, procainamide, quinidine, Cardioquin®, Norpace®, Procanbid®, or Veronda Villarreal. Diphenhydramine (Benadryl®) is an antihistamine. Some phenothiazine medicines are Compazine®, Mellaril®, Phenergan®, Serentil®, Tacaryl®, Thorazine®, or Trilafon®. Some medicines to treat depression are amitriptyline, nortriptyline, Norpramin®, or Vivactil®. Cortisone and prednisone are steroid medicines. · You should not use dicyclomine within 2 to 3 hours of taking antacids (Maalox®, Mylanta®) or medicine to stop diarrhea. Warnings While Using This Medicine:   · Make sure your doctor knows if you are pregnant, if you have liver disease, kidney disease, or overactive thyroid. Tell your doctor about any heart or blood vessel problems you have, including heart rhythm problems, congestive heart failure, or high blood pressure. Make sure your doctor knows if you have ongoing diarrhea, or an ileostomy or colostomy. Tell your doctor if you have autonomic neuropathy (a nerve problem), or a hiatal hernia (problems with your esophagus). · This medicine may make you sweat less. Your body could get too hot if you do not sweat enough. Stay out of hot places. Try to stay indoors or somewhere cool during hot weather. If you have a fever, call your doctor for advice. If your body gets too hot, you might feel dizzy, weak, tired, or confused. You might have an upset stomach or vomit.  Call your doctor if you are too hot and cannot cool down. · This medicine may make you drowsy. Avoid driving, using machines, or doing anything else that could be dangerous if you are not alert. · Your eyes may be more sensitive to bright light while you are using this medicine. You may want to wear sunglasses in bright sunlight. Possible Side Effects While Using This Medicine:   Call your doctor right away if you notice any of these side effects:  · Allergic reaction: Itching or hives, swelling in your face or hands, swelling or tingling in your mouth or throat, chest tightness, trouble breathing  · Fast or uneven heartbeat. · Restlessness, agitation, or confusion. · Severe dizziness or light-headedness. If you notice these less serious side effects, talk with your doctor:   · Decrease in how much or how often you urinate. · Drowsiness or weakness. · Dry mouth. · Nausea, vomiting, constipation, or stomach pain. · Trouble focusing or other vision changes. If you notice other side effects that you think are caused by this medicine, tell your doctor. Call your doctor for medical advice about side effects. You may report side effects to FDA at 7-840-FDA-8444  © 2017 [address] Information is for End User's use only and may not be sold, redistributed or otherwise used for commercial purposes. The above information is an  only. It is not intended as medical advice for individual conditions or treatments. Talk to your doctor, nurse or pharmacist before following any medical regimen to see if it is safe and effective for you.

## 2017-05-05 NOTE — PROGRESS NOTES
Chief Complaint   Patient presents with    Thyroid Problem    Cholesterol Problem    Depression    Labs     Patient in office today for f/u and fasting labs; have c/o of gi problems that have been present for 3/2017 due to increase in stress. 1. Have you been to the ER, urgent care clinic since your last visit? Hospitalized since your last visit? No    2. Have you seen or consulted any other health care providers outside of the [facility] since your last visit? Include any pap smears or colon screening.  No

## 2017-05-05 NOTE — PROGRESS NOTES
Chief Complaint   Patient presents with    Thyroid Problem    Cholesterol Problem    Depression    Labs     Patient in office today for f/u and fasting labs; Pt also have c/o of gi problems that have been present for 3/2017 due to increase in stress. 1. Have you been to the ER, urgent care clinic since your last visit? Hospitalized since your last visit? No    2. Have you seen or consulted any other health care providers outside of the [address] [place] [address] since your last visit? Include any pap smears or colon screening. No    Has had increased anxiety from work stress. Has not had problems with her stomach in years and now having issues. Having sensations of nausea, fluctuating constipation and diarrhea, stomach in knots. Knows it is stress related. Usually worse during the week when she is working. Denies previously being on medication for this since she was a teenager at the time. Takes gas x and zantac OTC for sx. Fluctuating constipation and diarrhea. Will be constipated for a few days and then have a complete clean out. Health Maintenance Due   Topic Date Due    FOBT Q 1 YEAR AGE 50-75  01/27/2017    BREAST CANCER SCRN MAMMOGRAM  03/16/2017     Has not scheduled her colonoscopy yet. Going to be difficult for patient to take off. Pt has stopped the singulair due to difficulty losing weight while on medication. Denies any other concerns at this time. Chief Complaint   Patient presents with    Thyroid Problem    Cholesterol Problem    Depression    Labs     she is a 50y.o. year old female who presents for evalution. Reviewed PmHx, RxHx, FmHx, SocHx, AllgHx and updated and dated in the chart.     Review of Systems - negative except as listed above in the HPI    Objective:     Vitals:    05/05/17 0739   BP: 102/73   Pulse: 86   Resp: 18   Temp: 97.6 °F (36.4 °C)   TempSrc: Oral   SpO2: 98%   Weight: 183 lb (83 kg)   Height: 5' 2\" (1.575 m)     Physical Examination: General appearance - alert, well appearing, and in no distress  Mental status - anxious  Eyes - pupils equal and reactive, extraocular eye movements intact  Ears - bilateral TM's and external ear canals normal  Nose - normal and patent, no erythema, discharge or polyps and normal nontender sinuses  Mouth - mucous membranes moist, pharynx normal without lesions  Neck - supple, no significant adenopathy, carotids upstroke normal bilaterally, no bruits, thyroid exam: thyroid is normal in size without nodules or tenderness  Chest - clear to auscultation, no wheezes, rales or rhonchi, symmetric air entry  Heart - normal rate, regular rhythm, normal S1, S2, no murmurs,  Abdomen - soft, nontender, nondistended, no masses or organomegaly  bowel sounds normal  no CVA tenderness  Extremities - peripheral pulses normal, no ankle edema, no clubbing or cyanosis  Skin - normal coloration and turgor, no rashes, no suspicious skin lesions noted    Assessment/ Plan:   Claudia was seen today for thyroid problem, cholesterol problem, depression and labs. Diagnoses and all orders for this visit:    Irritable bowel syndrome with both constipation and diarrhea  -     dicyclomine (BENTYL) 10 mg capsule; Take 1 Cap by mouth three (3) times daily as needed. Start bentyl TID PRN for sx. Reviewed SEs/ADRs of medication. Enc pt to follow up if sx persist or worsen and will refer to GI for further evaluation. MICHELLE (generalized anxiety disorder)  Continue wellbutrin daily. Mixed hyperlipidemia  -     LIPID PANEL  -     METABOLIC PANEL, COMPREHENSIVE  -     CBC WITH AUTOMATED DIFF  Continue with efforts to follow a heart healthy diet and exercise as tolerated. Vitamin D deficiency  -     VITAMIN D, 25 HYDROXY  Will notify results and deviate plan based on findings. Primary hypothyroidism  -     TSH 3RD GENERATION  Will notify results and deviate plan based on findings.     Screening for malignant neoplasm of colon  - OCCULT BLOOD, IMMUNOASSAY (FIT)  Will notify results and deviate plan based on findings. Screening for malignant neoplasm of breast / Dense breast tissue on mammogram  -     [facility] 3D ISAAC W MAMMO BI SCREENING INCL CAD; Future  Recommended pt complete 3d mammogram due to history of dense breast tissue. Follow-up Disposition:  Return if symptoms worsen or fail to improve. I have discussed the diagnosis with the patient and the intended plan as seen in the above orders. The patient has received an after-visit summary and questions were answered concerning future plans. Medication Side Effects and Warnings were discussed with patient: yes  Patient Labs were reviewed and or requested: yes  Patient Past Records were reviewed and or requested  yes  Patient / Caregiver Understanding of treatment plan was verbalized during office visit YES    ELIAN Juarez    Patient Instructions   Dicyclomine (By mouth)   Dicyclomine (dye-SYE-kloe-meen)  Treats irritable bowel syndrome. Brand Name(s): Bentyl   There may be other brand names for this medicine. When This Medicine Should Not Be Used: You should not use this medicine if you have had an allergic reaction to dicyclomine. Do not use this medicine if you have glaucoma, myasthenia gravis, or trouble urinating because of a blockage (such as an enlarged prostate). Make sure your doctor knows about all digestion problems you have, including reflux esophagitis (GERD), or severe ulcerative colitis. You should not use this medicine if you are breast feeding. This medicine should not be given to infants less than 6 months old. How to Use This Medicine:   Capsule, Tablet, Long Acting Tablet, Liquid  · Take your medicine as directed. Your dose may need to be changed several times to find what works best for you. · Swallow the extended-release tablet whole. Do not break, crush or chew.   · Measure the oral liquid medicine with a marked measuring spoon, oral syringe, or medicine cup. If a dose is missed:   · Take a dose as soon as you remember. If it is almost time for your next dose, wait until then and take a regular dose. Do not take extra medicine to make up for a missed dose. How to Store and Dispose of This Medicine:   · Store the medicine in a closed container at room temperature, away from heat, moisture, and direct light. Do not freeze the oral liquid. · Ask your pharmacist, doctor, or health caregiver about the best way to dispose of any outdated medicine or medicine no longer needed. · Keep all medicine out of the reach of children. Never share your medicine with anyone. Drugs and Foods to Avoid:   Ask your doctor or pharmacist before using any other medicine, including over-the-counter medicines, vitamins, and herbal products. · Make sure your doctor knows if you are using amantadine (Symmetrel®), digoxin (Lanoxin®), or a belladonna medicine such as atropine, hyoscyamine, scopolamine, Anaspaz®, Arco-Lase® Plus, or Donnatal®. Tell your doctor if you are using an MAO inhibitor such as Eldepryl®, Marplan®, [place], or Parnate®. · Tell your doctor if you are also using narcotic pain medicine, medicine for heart rhythm problems, an antihistamine, medicine to treat depression, phenothiazines (medicines to treat certain mental problems or severe nausea or vomiting), or a steroid medicine. Meperidine (Demerol®) is a narcotic pain medicine. Some medicines for heart rhythm problems are disopyramide, procainamide, quinidine, Cardioquin®, Norpace®, Procanbid®, or Franchot Smolder. Diphenhydramine (Benadryl®) is an antihistamine. Some phenothiazine medicines are Compazine®, Mellaril®, Phenergan®, Serentil®, Tacaryl®, Thorazine®, or Trilafon®. Some medicines to treat depression are amitriptyline, nortriptyline, Norpramin®, or Vivactil®. Cortisone and prednisone are steroid medicines.   · You should not use dicyclomine within 2 to 3 hours of taking antacids (Maalox®, Mylanta®) or medicine to stop diarrhea. Warnings While Using This Medicine:   · Make sure your doctor knows if you are pregnant, if you have liver disease, kidney disease, or overactive thyroid. Tell your doctor about any heart or blood vessel problems you have, including heart rhythm problems, congestive heart failure, or high blood pressure. Make sure your doctor knows if you have ongoing diarrhea, or an ileostomy or colostomy. Tell your doctor if you have autonomic neuropathy (a nerve problem), or a hiatal hernia (problems with your esophagus). · This medicine may make you sweat less. Your body could get too hot if you do not sweat enough. Stay out of hot places. Try to stay indoors or somewhere cool during hot weather. If you have a fever, call your doctor for advice. If your body gets too hot, you might feel dizzy, weak, tired, or confused. You might have an upset stomach or vomit. Call your doctor if you are too hot and cannot cool down. · This medicine may make you drowsy. Avoid driving, using machines, or doing anything else that could be dangerous if you are not alert. · Your eyes may be more sensitive to bright light while you are using this medicine. You may want to wear sunglasses in bright sunlight. Possible Side Effects While Using This Medicine:   Call your doctor right away if you notice any of these side effects:  · Allergic reaction: Itching or hives, swelling in your face or hands, swelling or tingling in your mouth or throat, chest tightness, trouble breathing  · Fast or uneven heartbeat. · Restlessness, agitation, or confusion. · Severe dizziness or light-headedness. If you notice these less serious side effects, talk with your doctor:   · Decrease in how much or how often you urinate. · Drowsiness or weakness. · Dry mouth. · Nausea, vomiting, constipation, or stomach pain. · Trouble focusing or other vision changes.   If you notice other side effects that you think are caused by this medicine, tell your doctor. Call your doctor for medical advice about side effects. You may report side effects to FDA at 6-960-IVD-5031  © 2017 [facility] Information is for End User's use only and may not be sold, redistributed or otherwise used for commercial purposes. The above information is an  only. It is not intended as medical advice for individual conditions or treatments. Talk to your doctor, nurse or pharmacist before following any medical regimen to see if it is safe and effective for you.

## 2017-05-05 NOTE — MR AVS SNAPSHOT
Visit Information Date & Time Provider Department Dept. Phone Encounter #  
 5/5/2017  7:30 AM Earlene Chung NP [facility] [phone] [id number] Follow-up Instructions Return if symptoms worsen or fail to improve. Upcoming Health Maintenance Date Due FOBT Q 1 YEAR AGE 50-75 1/27/2017 BREAST CANCER SCRN MAMMOGRAM 3/16/2017 INFLUENZA AGE 9 TO ADULT 8/1/2017 PAP AKA CERVICAL CYTOLOGY 3/10/2018 DTaP/Tdap/Td series (2 - Td) 2/25/2026 Allergies as of 5/5/2017  Review Complete On: 5/5/2017 By: Earlene Chung NP Severity Noted Reaction Type Reactions Biaxin [Clarithromycin]  06/23/2015    Diarrhea Other Medication  06/23/2015    Not Reported This Time  
 steroids Current Immunizations  Reviewed on 2/25/2016 Name Date Influenza Vaccine 10/1/2016, 10/1/2015 Tdap 2/25/2016 Not reviewed this visit You Were Diagnosed With   
  
 Codes Comments Irritable bowel syndrome with both constipation and diarrhea    -  Primary ICD-10-CM: K47.5 ICD-9-CM: 564.1   
 MICHELLE (generalized anxiety disorder)     ICD-10-CM: F41.1 ICD-9-CM: 300.02 Mixed hyperlipidemia     ICD-10-CM: E78.2 ICD-9-CM: 272.2 Vitamin D deficiency     ICD-10-CM: E55.9 ICD-9-CM: 268.9 Primary hypothyroidism     ICD-10-CM: E03.9 ICD-9-CM: 244.9 Screening for malignant neoplasm of colon     ICD-10-CM: Z12.11 ICD-9-CM: V76.51 Screening for malignant neoplasm of breast     ICD-10-CM: Z12.39 
ICD-9-CM: V76.10 Dense breast tissue on mammogram     ICD-10-CM: R92.2 ICD-9-CM: 793.89 Vitals BP Pulse Temp Resp Height(growth percentile) Weight(growth percentile) 102/73 (BP 1 Location: Right arm, BP Patient Position: Sitting) 86 97.6 °F (36.4 °C) (Oral) 18 5' 2\" (1.575 m) 183 lb (83 kg) SpO2 BMI OB Status Smoking Status 98% 33.47 kg/m2 Premenopausal Never Smoker Vitals History BMI and BSA Data Body Mass Index Body Surface Area  
 33.47 kg/m 2 1.91 m 2 Preferred Pharmacy Pharmacy Name Phone [facility] [place], [address] [address] Your Updated Medication List  
  
   
This list is accurate as of: 5/5/17  8:32 AM.  Always use your most recent med list.  
  
  
  
  
 Biotin 2,500 mcg Cap Take 5,000 mcg by mouth. buPROPion  mg SR tablet Commonly known as:  [place] Take 1 Tab by mouth two (2) times a day. CHILDREN'S MULTI VITAMINS PO Take  by mouth. dicyclomine 10 mg capsule Commonly known as:  BENTYL Take 1 Cap by mouth three (3) times daily as needed. FLONASE 50 mcg/actuation nasal spray Generic drug:  fluticasone 2 Sprays by Both Nostrils route daily. hydrOXYzine HCl 25 mg tablet Commonly known as:  ATARAX TAKE ONE TABLET BY MOUTH daily as needed for allergies. levothyroxine 137 mcg tablet Commonly known as:  SYNTHROID Take 137 mcg by mouth Daily (before breakfast). Dispense brand Synthroid.  
  
 loratadine 10 mg tablet Commonly known as:  [employer] Take 10 mg by mouth daily. LORazepam 1 mg tablet Commonly known as:  ATIVAN Take 3 Tabs by mouth nightly as needed for Anxiety. Max Daily Amount: 3 mg. Prescriptions Sent to Pharmacy Refills  
 dicyclomine (BENTYL) 10 mg capsule 1 Sig: Take 1 Cap by mouth three (3) times daily as needed. Class: Normal  
 Pharmacy: [facility]. [facility]  #: [phone] Route: Oral  
  
We Performed the Following CBC WITH AUTOMATED DIFF [59437 CPT(R)] LIPID PANEL [86180 CPT(R)] METABOLIC PANEL, COMPREHENSIVE [28715 CPT(R)] OCCULT BLOOD, IMMUNOASSAY (FIT) [id number] CPT(R)] TSH 3RD GENERATION [98424 CPT(R)] VITAMIN D, 25 HYDROXY [id number] CPT(R)] Follow-up Instructions Return if symptoms worsen or fail to improve.   
  
To-Do List   
 05/08/2017 Imaging:  [facility] 3D ISAAC W MAMMO BI SCREENING INCL CAD Patient Instructions Dicyclomine (By mouth) Dicyclomine (dye-SYE-kloe-meen) Treats irritable bowel syndrome. Brand Name(s): Bentyl There may be other brand names for this medicine. When This Medicine Should Not Be Used: You should not use this medicine if you have had an allergic reaction to dicyclomine. Do not use this medicine if you have glaucoma, myasthenia gravis, or trouble urinating because of a blockage (such as an enlarged prostate). Make sure your doctor knows about all digestion problems you have, including reflux esophagitis (GERD), or severe ulcerative colitis. You should not use this medicine if you are breast feeding. This medicine should not be given to infants less than 6 months old. How to Use This Medicine:  
Capsule, Tablet, Long Acting Tablet, Liquid · Take your medicine as directed. Your dose may need to be changed several times to find what works best for you. · Swallow the extended-release tablet whole. Do not break, crush or chew. · Measure the oral liquid medicine with a marked measuring spoon, oral syringe, or medicine cup. If a dose is missed: · Take a dose as soon as you remember. If it is almost time for your next dose, wait until then and take a regular dose. Do not take extra medicine to make up for a missed dose. How to Store and Dispose of This Medicine: · Store the medicine in a closed container at room temperature, away from heat, moisture, and direct light. Do not freeze the oral liquid. · Ask your pharmacist, doctor, or health caregiver about the best way to dispose of any outdated medicine or medicine no longer needed. · Keep all medicine out of the reach of children. Never share your medicine with anyone. Drugs and Foods to Avoid: Ask your doctor or pharmacist before using any other medicine, including over-the-counter medicines, vitamins, and herbal products. · Make sure your doctor knows if you are using amantadine (Symmetrel®), digoxin (Lanoxin®), or a belladonna medicine such as atropine, hyoscyamine, scopolamine, Anaspaz®, Arco-Lase® Plus, or Donnatal®. Tell your doctor if you are using an MAO inhibitor such as Eldepryl®, Marplan®, [place], or Parnate®. · Tell your doctor if you are also using narcotic pain medicine, medicine for heart rhythm problems, an antihistamine, medicine to treat depression, phenothiazines (medicines to treat certain mental problems or severe nausea or vomiting), or a steroid medicine. Meperidine (Demerol®) is a narcotic pain medicine. Some medicines for heart rhythm problems are disopyramide, procainamide, quinidine, Cardioquin®, Norpace®, Procanbid®, or Emily Salter. Diphenhydramine (Benadryl®) is an antihistamine. Some phenothiazine medicines are Compazine®, Mellaril®, Phenergan®, Serentil®, Tacaryl®, Thorazine®, or Trilafon®. Some medicines to treat depression are amitriptyline, nortriptyline, Norpramin®, or Vivactil®. Cortisone and prednisone are steroid medicines. · You should not use dicyclomine within 2 to 3 hours of taking antacids (Maalox®, Mylanta®) or medicine to stop diarrhea. Warnings While Using This Medicine: · Make sure your doctor knows if you are pregnant, if you have liver disease, kidney disease, or overactive thyroid. Tell your doctor about any heart or blood vessel problems you have, including heart rhythm problems, congestive heart failure, or high blood pressure. Make sure your doctor knows if you have ongoing diarrhea, or an ileostomy or colostomy. Tell your doctor if you have autonomic neuropathy (a nerve problem), or a hiatal hernia (problems with your esophagus). · This medicine may make you sweat less. Your body could get too hot if you do not sweat enough. Stay out of hot places. Try to stay indoors or somewhere cool during hot weather.  If you have a fever, call your doctor for advice. If your body gets too hot, you might feel dizzy, weak, tired, or confused. You might have an upset stomach or vomit. Call your doctor if you are too hot and cannot cool down. · This medicine may make you drowsy. Avoid driving, using machines, or doing anything else that could be dangerous if you are not alert. · Your eyes may be more sensitive to bright light while you are using this medicine. You may want to wear sunglasses in bright sunlight. Possible Side Effects While Using This Medicine:  
Call your doctor right away if you notice any of these side effects: · Allergic reaction: Itching or hives, swelling in your face or hands, swelling or tingling in your mouth or throat, chest tightness, trouble breathing · Fast or uneven heartbeat. · Restlessness, agitation, or confusion. · Severe dizziness or light-headedness. If you notice these less serious side effects, talk with your doctor: · Decrease in how much or how often you urinate. · Drowsiness or weakness. · Dry mouth. · Nausea, vomiting, constipation, or stomach pain. · Trouble focusing or other vision changes. If you notice other side effects that you think are caused by this medicine, tell your doctor. Call your doctor for medical advice about side effects. You may report side effects to FDA at 4-421-FDA-8933 © 2017 [facility] Information is for End User's use only and may not be sold, redistributed or otherwise used for commercial purposes. The above information is an  only. It is not intended as medical advice for individual conditions or treatments. Talk to your doctor, nurse or pharmacist before following any medical regimen to see if it is safe and effective for you. Introducing [facility]! Dear Yarelis: Thank you for requesting a [employer] account. Our records indicate that you already have an active [employer] account.   You can access your account anytime at [email or web address] Did you know that you can access your hospital and ER discharge instructions at any time in [employer]? You can also review all of your test results from your hospital stay or ER visit. Additional Information If you have questions, please visit the Frequently Asked Questions section of the [employer] website at [email or web address] Remember, [employer] is NOT to be used for urgent needs. For medical emergencies, dial 911. Now available from your iPhone and Android! Please provide this summary of care documentation to your next provider. Your primary care clinician is listed as Veronica Elizabeth. If you have any questions after today's visit, please call [phone].

## 2017-05-06 LAB
25(OH)D3+25(OH)D2 SERPL-MCNC: 41.4 NG/ML (ref 30–100)
ALBUMIN SERPL-MCNC: 4.4 G/DL (ref 3.5–5.5)
ALBUMIN/GLOB SERPL: 1.6 {RATIO} (ref 1.2–2.2)
ALP SERPL-CCNC: 111 IU/L (ref 39–117)
ALT SERPL-CCNC: 52 IU/L (ref 0–32)
AST SERPL-CCNC: 33 IU/L (ref 0–40)
BASOPHILS # BLD AUTO: 0 X10E3/UL (ref 0–0.2)
BASOPHILS NFR BLD AUTO: 0 %
BILIRUB SERPL-MCNC: 0.3 MG/DL (ref 0–1.2)
BUN SERPL-MCNC: 9 MG/DL (ref 6–24)
BUN/CREAT SERPL: 11 (ref 9–23)
CALCIUM SERPL-MCNC: 9.3 MG/DL (ref 8.7–10.2)
CHLORIDE SERPL-SCNC: 103 MMOL/L (ref 96–106)
CHOLEST SERPL-MCNC: 212 MG/DL (ref 100–199)
CO2 SERPL-SCNC: 21 MMOL/L (ref 18–29)
CREAT SERPL-MCNC: 0.84 MG/DL (ref 0.57–1)
EOSINOPHIL # BLD AUTO: 0.2 X10E3/UL (ref 0–0.4)
EOSINOPHIL NFR BLD AUTO: 3 %
ERYTHROCYTE [DISTWIDTH] IN BLOOD BY AUTOMATED COUNT: 13.5 % (ref 12.3–15.4)
GLOBULIN SER CALC-MCNC: 2.7 G/DL (ref 1.5–4.5)
GLUCOSE SERPL-MCNC: 99 MG/DL (ref 65–99)
HCT VFR BLD AUTO: 41.6 % (ref 34–46.6)
HDLC SERPL-MCNC: 38 MG/DL
HGB BLD-MCNC: 13.9 G/DL (ref 11.1–15.9)
IMM GRANULOCYTES # BLD: 0 X10E3/UL (ref 0–0.1)
IMM GRANULOCYTES NFR BLD: 0 %
INTERPRETATION, 910389: NORMAL
LDLC SERPL CALC-MCNC: 130 MG/DL (ref 0–99)
LYMPHOCYTES # BLD AUTO: 2.5 X10E3/UL (ref 0.7–3.1)
LYMPHOCYTES NFR BLD AUTO: 33 %
MCH RBC QN AUTO: 31.5 PG (ref 26.6–33)
MCHC RBC AUTO-ENTMCNC: 33.4 G/DL (ref 31.5–35.7)
MCV RBC AUTO: 94 FL (ref 79–97)
MONOCYTES # BLD AUTO: 0.5 X10E3/UL (ref 0.1–0.9)
MONOCYTES NFR BLD AUTO: 7 %
NEUTROPHILS # BLD AUTO: 4.2 X10E3/UL (ref 1.4–7)
NEUTROPHILS NFR BLD AUTO: 57 %
PLATELET # BLD AUTO: 320 X10E3/UL (ref 150–379)
POTASSIUM SERPL-SCNC: 4.6 MMOL/L (ref 3.5–5.2)
PROT SERPL-MCNC: 7.1 G/DL (ref 6–8.5)
RBC # BLD AUTO: 4.41 X10E6/UL (ref 3.77–5.28)
SODIUM SERPL-SCNC: 142 MMOL/L (ref 134–144)
TRIGL SERPL-MCNC: 219 MG/DL (ref 0–149)
TSH SERPL DL<=0.005 MIU/L-ACNC: 2.36 UIU/ML (ref 0.45–4.5)
VLDLC SERPL CALC-MCNC: 44 MG/DL (ref 5–40)
WBC # BLD AUTO: 7.4 X10E3/UL (ref 3.4–10.8)

## 2017-05-07 DIAGNOSIS — F41.8 ANXIETY ASSOCIATED WITH DEPRESSION: ICD-10-CM

## 2017-05-07 RX ORDER — LORAZEPAM 1 MG/1
TABLET ORAL
Qty: 90 TAB | Refills: 2 | OUTPATIENT
Start: 2017-05-07

## 2017-05-08 DIAGNOSIS — E78.2 MIXED HYPERLIPIDEMIA: Primary | ICD-10-CM

## 2017-05-08 RX ORDER — EZETIMIBE 10 MG/1
10 TABLET ORAL DAILY
Qty: 90 TAB | Refills: 1 | Status: SHIPPED | OUTPATIENT
Start: 2017-05-08

## 2017-05-08 RX ORDER — SIMVASTATIN 10 MG/1
10 TABLET, FILM COATED ORAL
Qty: 90 TAB | Refills: 3 | Status: SHIPPED | OUTPATIENT
Start: 2017-05-08 | End: 2017-05-08 | Stop reason: CLARIF

## 2017-05-08 NOTE — PROGRESS NOTES
The following message was sent to pt via [employer] portal in reference to lab results:    Good morning Ms. Joseph Gerber are the results of your most recent lab work. I have the following recommendations:    1. Your CBC which looks at your white blood cells, red blood cells, and hemoglobin came back looking normal. No sign of infection or anemia. 2. Your metabolic panel which looks at your blood glucose, liver function, and kidney function good minus your liver function enzyme being slightly elevated. This is more than likely a result of your cholesterol being a high and fatty plaques developing on the liver. 3. Your cholesterol has worsened. I think it's time we start you on medication for this. I want to start you on a medication called Zocor. It is a medication that is used to lower cholesterol. I want you to take one tab every night for the next 6 weeks and then we can recheck your cholesterol to make sure it is coming down. We need to get this under control because having high cholesterol will contribute to lowering your cholesterol and decrease your risk of heart disease. 4. Your TSH shows that your thyroid disease is well controlled on your current dose of Synthroid. 5. Your vitamin D levels are stable. Lets recheck these labs in 6 weeks.  Please do not hesitate to call me or schedule an appointment to be seen if you need anything else in the meantime :)    Corrine Essex, FNP-C

## 2017-05-10 ENCOUNTER — DOCUMENTATION ONLY (OUTPATIENT)
Dept: FAMILY MEDICINE CLINIC | Age: 52
End: 2017-05-10

## 2017-05-24 ENCOUNTER — DOCUMENTATION ONLY (OUTPATIENT)
Dept: FAMILY MEDICINE CLINIC | Age: 52
End: 2017-05-24

## 2017-05-24 NOTE — PROGRESS NOTES
Zetia generic denied-Per insurance pt has to try & fail- 2 of atorvastatin 40 or 80 mg, brand Crestor 20 or 40 mg, rosuvastatin 20 or 40 mg.

## 2017-05-25 ENCOUNTER — TELEPHONE (OUTPATIENT)
Dept: FAMILY MEDICINE CLINIC | Age: 52
End: 2017-05-25

## 2017-05-25 DIAGNOSIS — E78.2 MIXED HYPERLIPIDEMIA: Primary | ICD-10-CM

## 2017-05-25 RX ORDER — ROSUVASTATIN CALCIUM 20 MG/1
20 TABLET, COATED ORAL
Qty: 30 TAB | Refills: 2 | Status: SHIPPED | OUTPATIENT
Start: 2017-05-25

## 2017-05-25 NOTE — TELEPHONE ENCOUNTER
----- Message from Michel Posey sent at 5/25/2017  7:50 AM EDT -----  Regarding: /heaven  Pt is returning a call from this morning. Pts number is [phone].

## 2017-05-25 NOTE — PROGRESS NOTES
Please see if pt is willing to try Crestor. Has less SE's compared to other statin's. If she tries and fails after 30 days, can try to resubmit PA.

## 2017-05-25 NOTE — PROGRESS NOTES
Spoke with pt explained of insurance outcome and what is needed; pt stated she is wiling to try crestor. Advsied will be sent to pharmacy.

## 2021-11-10 ENCOUNTER — TRANSCRIBE ORDER (OUTPATIENT)
Dept: SCHEDULING | Age: 56
End: 2021-11-10

## 2021-11-10 DIAGNOSIS — Z13.820 SCREENING FOR OSTEOPOROSIS: Primary | ICD-10-CM

## 2021-11-26 ENCOUNTER — HOSPITAL ENCOUNTER (OUTPATIENT)
Dept: MAMMOGRAPHY | Age: 56
Discharge: HOME | End: 2021-11-26
Attending: INTERNAL MEDICINE
Payer: COMMERCIAL

## 2021-11-26 DIAGNOSIS — Z13.820 SCREENING FOR OSTEOPOROSIS: ICD-10-CM

## 2021-11-26 PROCEDURE — 77080 DXA BONE DENSITY AXIAL: CPT

## 2022-03-19 PROBLEM — R92.2 DENSE BREAST TISSUE ON MAMMOGRAM: Status: ACTIVE | Noted: 2017-05-05

## 2022-03-20 PROBLEM — E78.2 MIXED HYPERLIPIDEMIA: Status: ACTIVE | Noted: 2017-01-15

## 2023-12-14 ENCOUNTER — TRANSCRIBE ORDERS (OUTPATIENT)
Facility: HOSPITAL | Age: 58
End: 2023-12-14

## 2023-12-14 DIAGNOSIS — Z12.31 SCREENING MAMMOGRAM FOR HIGH-RISK PATIENT: Primary | ICD-10-CM
